# Patient Record
Sex: MALE | Race: WHITE | NOT HISPANIC OR LATINO | Employment: FULL TIME | URBAN - METROPOLITAN AREA
[De-identification: names, ages, dates, MRNs, and addresses within clinical notes are randomized per-mention and may not be internally consistent; named-entity substitution may affect disease eponyms.]

---

## 2020-08-25 ENCOUNTER — OFFICE VISIT (OUTPATIENT)
Dept: URGENT CARE | Facility: CLINIC | Age: 25
End: 2020-08-25
Payer: COMMERCIAL

## 2020-08-25 VITALS
WEIGHT: 160 LBS | SYSTOLIC BLOOD PRESSURE: 123 MMHG | DIASTOLIC BLOOD PRESSURE: 71 MMHG | TEMPERATURE: 98 F | BODY MASS INDEX: 22.4 KG/M2 | HEIGHT: 71 IN | HEART RATE: 73 BPM | RESPIRATION RATE: 18 BRPM | OXYGEN SATURATION: 97 %

## 2020-08-25 DIAGNOSIS — S61.011A LACERATION OF RIGHT THUMB WITHOUT FOREIGN BODY WITHOUT DAMAGE TO NAIL, INITIAL ENCOUNTER: Primary | ICD-10-CM

## 2020-08-25 PROCEDURE — 12001 RPR S/N/AX/GEN/TRNK 2.5CM/<: CPT | Mod: S$GLB,,, | Performed by: NURSE PRACTITIONER

## 2020-08-25 PROCEDURE — 12001 LACERATION REPAIR: ICD-10-PCS | Mod: S$GLB,,, | Performed by: NURSE PRACTITIONER

## 2020-08-25 PROCEDURE — 99203 PR OFFICE/OUTPT VISIT, NEW, LEVL III, 30-44 MIN: ICD-10-PCS | Mod: 25,S$GLB,, | Performed by: NURSE PRACTITIONER

## 2020-08-25 PROCEDURE — 99203 OFFICE O/P NEW LOW 30 MIN: CPT | Mod: 25,S$GLB,, | Performed by: NURSE PRACTITIONER

## 2020-08-25 RX ORDER — IBUPROFEN 600 MG/1
600 TABLET ORAL EVERY 8 HOURS PRN
Qty: 24 TABLET | Refills: 0 | Status: SHIPPED | OUTPATIENT
Start: 2020-08-25 | End: 2020-09-02 | Stop reason: ALTCHOICE

## 2020-08-25 RX ORDER — MUPIROCIN 20 MG/G
OINTMENT TOPICAL
Qty: 22 G | Refills: 0 | Status: SHIPPED | OUTPATIENT
Start: 2020-08-25 | End: 2020-09-02 | Stop reason: ALTCHOICE

## 2020-08-25 NOTE — LETTER
Work Status Summary - Page 1 of 2  ______________________________________________________________________    Date :  August 25, 2020 Carrier :    To :  Fax # :    ______________________________________________________________________    Patient Name: Nicola Gilbert   YOB: 1995   Employer:    Occupation:    Date of Injury: 8/25/2020   Diagnosis: Laceration of right thumb   ______________________________________________________________________     [   ] ABLE to work (pre-injury work level / full duty)    [X  ] NOT ABLE to work at present  Estimated release to return to work: To be determined by Occupational Health on 8/26/2020     [   ] ABLE to work --- transitional duty (as follows):   [   ] Sedentary Work: Lifting 10 lbs. maximum and occasionally lifting and/or  carrying articles such as dockets, ledgers and small tools. Although a sedentary  job is defined as one which involves sitting, a certain amount of walking and  standing is often necessary in carrying out job duties. Jobs are sedentary if  walking and standing are required only occasionally and other sedentary criteria  are met.      [   ] Light Work: Lifting 20 lbs. maximum with frequent lifting and/or carrying of  objects weighing up to 10 lbs. Even though the weight lifted may be only a  negligible amount, a job is in this category when it requires walking or standing  to a significant degree, or when it involves sitting most of the time with a degree  of pushing and pulling of arm and/or leg controls.      [   ] Medium Work: Lifting 50 lbs. maximum with frequent lifting and/or carrying  of objects weighing up to 25 lbs.      [   ] Heavy Work: Lifting 100 lbs. maximum with frequent lifting and/or carrying  of objects weighing up to 50 lbs.      [   ] Very Heavy Work:  Lifting objects in excess of 100 lbs. with frequent lifting  and/or carrying of objects weighing 50 lbs or more.                   THERAPY RECOMMENDATIONS:   [   ]  Physical Therapy     Visits per week:   Duration (in weeks):        [   ] Occupational Therapy  Visits per week:   Duration (in weeks):        Recommended Follow Up:    Primary Care Physician in:   days General Surgeon in:   days   Orthopedist in:   days Ophthalmologist in:   days     Other:  (list other follow up recommendation here)   days     Prescribed Medications:  Ibuprofen 600mg by mouth every 8 hours as needed for pain     Comments:  Keep right hand dressing on x 24 hours-- then may remove and wash with soap and water; pat dry; apply mupirocin and cover with bandage         ______________________________________________________________________  Provider Signature / Print Name / Date / Time       Work Status Summary - Page 2 of 2    Form No. 3291   (Rev 6/21/16)   [employer] [place]

## 2020-08-25 NOTE — PROGRESS NOTES
Subjective:       Patient ID: Nicola Gilbert is a 24 y.o. male.    Chief Complaint: Laceration    This is a 24 y.o. male   who presents today with a chief complaint of right thumb laceration that happened today. He was at work removing sheet medal from his truck when metal sliced his thumb. He hasn't taken any medication to help relieve his symptoms. Per [facility] registry-- tetanus last 11/2018    Laceration   The incident occurred less than 1 hour ago. The laceration is located on the left hand and right hand. The laceration mechanism was a metal edge. The pain is at a severity of 0/10. The patient is experiencing no pain. He reports no foreign bodies present. His tetanus status is UTD.       Constitution: Negative for chills, fatigue and fever.   HENT: Negative for congestion and sore throat.    Neck: Negative for painful lymph nodes.   Cardiovascular: Negative for chest pain and leg swelling.   Eyes: Negative for double vision and blurred vision.   Respiratory: Negative for cough and shortness of breath.    Gastrointestinal: Negative for nausea, vomiting and diarrhea.   Genitourinary: Negative for dysuria, frequency and urgency.   Musculoskeletal: Negative for joint pain, joint swelling, muscle cramps and muscle ache.   Skin: Positive for laceration. Negative for color change, pale and rash.   Allergic/Immunologic: Negative for seasonal allergies.   Neurological: Positive for tingling. Negative for dizziness, history of vertigo, light-headedness, passing out and headaches.   Hematologic/Lymphatic: Negative for swollen lymph nodes, easy bruising/bleeding and history of blood clots. Does not bruise/bleed easily.   Psychiatric/Behavioral: Negative for nervous/anxious, sleep disturbance and depression. The patient is not nervous/anxious.         Objective:      Physical Exam  Constitutional:       Appearance: He is well-developed.   Cardiovascular:      Rate and Rhythm: Normal rate and regular rhythm.   Pulmonary:       Effort: Pulmonary effort is normal. No respiratory distress.      Breath sounds: Normal breath sounds.   Abdominal:      General: There is no distension.      Palpations: Abdomen is soft.   Musculoskeletal: Normal range of motion.      Right hand: He exhibits tenderness and laceration. He exhibits normal range of motion and normal capillary refill. Normal sensation noted. Decreased sensation is not present in the ulnar distribution and is not present in the radial distribution. Normal strength noted. He exhibits no finger abduction and no thumb/finger opposition.        Hands:    Skin:     General: Skin is warm and dry.      Findings: No rash.   Neurological:      Mental Status: He is alert and oriented to person, place, and time.                 Assessment:       1. Laceration of right thumb without foreign body without damage to nail, initial encounter        Plan:         Medications Ordered This Encounter   Medications    ibuprofen (ADVIL,MOTRIN) 600 MG tablet     Sig: Take 1 tablet (600 mg total) by mouth every 8 (eight) hours as needed for Pain.     Dispense:  24 tablet     Refill:  0    mupirocin (BACTROBAN) 2 % ointment     Sig: Apply to affected area 2 times daily     Dispense:  22 g     Refill:  0            Follow up in about 1 day (around 8/26/2020) for Occupational Evaluation.

## 2020-08-25 NOTE — PROCEDURES
Laceration Repair    Date/Time: 8/25/2020 11:40 AM  Performed by: Reena Jones NP  Authorized by: Reena Jones NP   Body area: upper extremity  Location details: right thumb  Laceration length: 1.4 cm  Foreign bodies: no foreign bodies  Tendon involvement: none    Anesthesia:  Local Anesthetic: lidocaine 1% without epinephrine  Anesthetic total: 5 mL  Patient sedated: no  Preparation: Patient was prepped and draped in the usual sterile fashion.  Irrigation solution: saline  Irrigation method: syringe  Amount of cleaning: standard  Debridement: none  Degree of undermining: none  Skin closure: 6-0 Prolene  Number of sutures: 4  Technique: simple  Approximation: close  Approximation difficulty: simple  Dressing: antibiotic ointment and bulky dressing  Patient tolerance: Patient tolerated the procedure well with no immediate complications

## 2020-08-25 NOTE — PATIENT INSTRUCTIONS
Return to Urgent Care or go to ER if symptoms worsen or fail to improve.  Follow up with Occupational Health tomorrow as recommended for further management.       Return to Urgent Care/[facility] in 7-10 days for suture removal.  Keep dressing on wound x 24 hours  After 24 hours, you may remove dressing and wash wound with soap and water twice daily-- pat the area dry  Apply mupirocin ointment and cover with bandage    Extremity Laceration: Sutures, Staples, or Tape  A laceration is a cut through the skin. If it is deep, it may require stitches (sutures) or staples to close so it can heal. Minor cuts may be treated with surgical tape closures.   X-rays may be done if something may have entered the skin through the cut. You may also need a tetanus shot if you are not up to date on this vaccination.  Home care  · Follow the health care providers instructions on how to care for the cut.  · Wash your hands with soap and warm water before and after caring for your wound. This is to help prevent infection.  · Keep the wound clean and dry. If a bandage was applied and it becomes wet or dirty, replace it. Otherwise, leave it in place for the first 24 hours, then change it once a day or as directed.  · If sutures or staples were used, clean the wound daily:  · After removing the bandage, wash the area with soap and water. Use a wet cotton swab to loosen and remove any blood or crust that forms.  · After cleaning, keep the wound clean and dry. Talk with your doctor before applying any antibiotic ointment to the wound. Reapply the bandage.  · You may remove the bandage to shower as usual after the first 24 hours, but do not soak the area in water (no swimming) until the stitches or staples are removed.  · If surgical tape closures were used, keep the area clean and dry. If it becomes wet, blot it dry with a towel.  · The doctor may prescribe an antibiotic cream or ointment to prevent infection. Do not stop taking this  medication until you have finished the prescribed course or the doctor tells you to stop. The doctor may also prescribe medications for pain. Follow the doctors instructions for taking these medications.  · Avoid activities that may reopen your wound.  Follow-up care  Follow up with your health care provider. Most skin wounds heal within ten days. However, an infection may sometimes occur despite proper treatment. Therefore, check the wound daily for the signs of infection listed below. Stitches and staples should be removed within 7-14 days. If surgical tape closures were used, you may remove them after 10 days if they have not fallen off by then.   When to seek medical advice  Call your health care provider right away if any of these occur:  · Wound bleeding not controlled by direct pressure  · Signs of infection, including increasing pain in the wound, increasing wound redness or swelling, or pus or bad odor coming from the wound  · Fever of 100.4°F (38ºC) or higher or as directed by your healthcare provider  · Stitches or staples come apart or fall out or surgical tape falls off before 7 days  · Wound edges re-open  · Wound changes colors  · Numbness around the wound   · Decreased movement around the injured area  Date Last Reviewed: 6/14/2015  © 3322-7500 "[employer]". [address] [place] [address], [place] [address]. All rights reserved. This information is not intended as a substitute for professional medical care. Always follow your healthcare professional's instructions.

## 2020-08-26 ENCOUNTER — OFFICE VISIT (OUTPATIENT)
Dept: URGENT CARE | Facility: CLINIC | Age: 25
End: 2020-08-26
Payer: COMMERCIAL

## 2020-08-26 VITALS
SYSTOLIC BLOOD PRESSURE: 128 MMHG | OXYGEN SATURATION: 100 % | BODY MASS INDEX: 22.4 KG/M2 | HEIGHT: 71 IN | HEART RATE: 100 BPM | TEMPERATURE: 98 F | DIASTOLIC BLOOD PRESSURE: 78 MMHG | RESPIRATION RATE: 18 BRPM | WEIGHT: 160 LBS
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
## 2020-08-26 DIAGNOSIS — S61.011A LACERATION OF RIGHT THUMB WITHOUT FOREIGN BODY WITHOUT DAMAGE TO NAIL, INITIAL ENCOUNTER: Primary | ICD-10-CM

## 2020-08-26 PROCEDURE — 99203 PR OFFICE/OUTPT VISIT, NEW, LEVL III, 30-44 MIN: ICD-10-PCS | Mod: S$GLB,,, | Performed by: PHYSICIAN ASSISTANT

## 2020-08-26 PROCEDURE — 99203 OFFICE O/P NEW LOW 30 MIN: CPT | Mod: S$GLB,,, | Performed by: PHYSICIAN ASSISTANT

## 2020-08-26 RX ORDER — DOXYCYCLINE HYCLATE 100 MG
100 TABLET ORAL 2 TIMES DAILY
Qty: 14 TABLET | Refills: 0 | Status: SHIPPED | OUTPATIENT
Start: 2020-08-26 | End: 2020-09-02 | Stop reason: ALTCHOICE

## 2020-08-26 NOTE — LETTER
[facility] [address]  [place] [address]  Phone: [phone]  Fax: [phone]  [facility]: [phone]    Pt Name: Nicola Gilbert  Injury Date: 08/25/2020   Employee ID: [id number] Date of First Treatment: 08/26/2020   Company: [employer]      Appointment Time: 10:15 AM Arrived: 10:30 a.m.   Provider: Casimiro Cobb PA-C Time Out: 11:50 p.m.     Office Treatment:   1. Laceration of right thumb without foreign body without damage to nail, initial encounter    2. Work related injury      Medications Ordered This Encounter   Medications    doxycycline (VIBRA-TABS) 100 MG tablet      Patient Instructions: Keep dressing clean/dry/covered    Restrictions: Limited use of right hand and arm, No lifting/pushing/pulling more than 10 lbs(Avoid climbing ladders or scalffolds.)     Return Appointment: 9/2/2020 at 1:00 p.m.  [place]

## 2020-08-26 NOTE — PATIENT INSTRUCTIONS
Wound Check, No Infection  Your wound is healing as expected. There are no signs of infection.   Home care  Continue to care for your wound as directed.  · Cover your wound with a bandage unless your healthcare provider tells you not to.  · Gently clean your wound with soap and water when you shower.   · Unless told otherwise, avoid swimming and taking tub baths until your wound has healed.  Follow-up care  Follow up with your healthcare provider as advised.  · If you have sutures or staples, return as directed to have them removed. If they are not taken out on time, they may be harder to remove and scarring may be worse. Infection may develop.  · If surgical tape strips were used, you can remove them yourself if they have not fallen off by 10 days after they were applied.   When to seek medical advice  Call your healthcare provider right away if any of these occur:  · Fever of 100.4ºF (38ºC) or higher, or as directed by your health care provider  · Symptoms of a wound infection, including:  ¨ Redness or swelling around the wound  ¨ Warmth coming from the wound  ¨ New or worsening pain  ¨ Red streaks around the wound  ¨ Draining pus  Date Last Reviewed: 8/24/2015  © 0968-7719 [employer]. [address] [place] [address], [place] [address]. All rights reserved. This information is not intended as a substitute for professional medical care. Always follow your healthcare professional's instructions.

## 2020-08-26 NOTE — PROGRESS NOTES
Subjective:       Patient ID: Nicola Gilbert is a 24 y.o. male.    Chief Complaint: Laceration (Right thumb)    New injury, Laceration right thumb (DOI 8/25/2020) [employer] [facility]- Patient comes in as a f/u from [facility] on 8/25/2020. Stiches were placed and Ibuprofen 600 mg rx given. 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 Pain level 1/10 with some throbbing pain. TD up to date. [place]    Laceration   The incident occurred 12 to 24 hours ago. The laceration is located on the right hand (thumb). The laceration mechanism was a metal edge. The pain is at a severity of 1/10. The pain is mild. The pain has been intermittent since onset. He reports no foreign bodies present. His tetanus status is UTD.       Constitution: Negative for fatigue.   HENT: Negative for facial swelling and facial trauma.    Neck: Negative for neck stiffness.   Cardiovascular: Negative for chest trauma.   Eyes: Negative for eye trauma, double vision and blurred vision.   Gastrointestinal: Negative for abdominal trauma, abdominal pain and rectal bleeding.   Endocrine: negative.   Genitourinary: Negative for hematuria, genital trauma and pelvic pain.   Musculoskeletal: Positive for pain and trauma. Negative for joint swelling, abnormal ROM of joint and pain with walking.   Skin: Positive for laceration. Negative for color change, wound and abrasion.   Allergic/Immunologic: Negative.    Neurological: Negative for dizziness, history of vertigo, light-headedness, coordination disturbances, altered mental status and loss of consciousness.   Hematologic/Lymphatic: Negative for history of bleeding disorder.   Psychiatric/Behavioral: Negative for altered mental status.        Objective:      Physical Exam  Vitals signs and nursing note reviewed.   Constitutional:       General: He is not in acute distress.     Appearance: He is well-developed. He is not  diaphoretic.   HENT:      Head: Normocephalic and atraumatic.      Right Ear: Hearing and external ear normal.      Left Ear: Hearing and external ear normal.      Nose: Nose normal. No nasal deformity.   Eyes:      General: Lids are normal. No scleral icterus.     Conjunctiva/sclera: Conjunctivae normal.   Neck:      Musculoskeletal: Normal range of motion.      Trachea: Trachea normal.   Cardiovascular:      Pulses: Normal pulses.   Pulmonary:      Effort: Pulmonary effort is normal. No respiratory distress.      Breath sounds: No stridor.   Musculoskeletal:      Right hand: He exhibits laceration. He exhibits normal range of motion, normal capillary refill, no deformity and no swelling. Normal sensation noted.        Hands:    Skin:     General: Skin is warm and dry.      Capillary Refill: Capillary refill takes less than 2 seconds.   Neurological:      Mental Status: He is alert. He is not disoriented.      GCS: GCS eye subscore is 4. GCS verbal subscore is 5. GCS motor subscore is 6.      Sensory: No sensory deficit.   Psychiatric:         Attention and Perception: He is attentive.         Speech: Speech normal.         Behavior: Behavior normal.         Assessment:       1. Laceration of right thumb without foreign body without damage to nail, initial encounter    2. Work related injury        Plan:         Medications Ordered This Encounter   Medications    doxycycline (VIBRA-TABS) 100 MG tablet     Sig: Take 1 tablet (100 mg total) by mouth 2 (two) times daily.     Dispense:  14 tablet     Refill:  0     Patient Instructions: Keep dressing clean/dry/covered   Restrictions: Limited use of right hand and arm, No lifting/pushing/pulling more than 10 lbs(Avoid climbing ladders or scalffolds.)  Follow up in about 1 week (around 9/2/2020) for suture removal.        Patient Instructions     Wound Check, No Infection  Your wound is healing as expected. There are no signs of infection.   Home care  Continue to care  for your wound as directed.  · Cover your wound with a bandage unless your healthcare provider tells you not to.  · Gently clean your wound with soap and water when you shower.   · Unless told otherwise, avoid swimming and taking tub baths until your wound has healed.  Follow-up care  Follow up with your healthcare provider as advised.  · If you have sutures or staples, return as directed to have them removed. If they are not taken out on time, they may be harder to remove and scarring may be worse. Infection may develop.  · If surgical tape strips were used, you can remove them yourself if they have not fallen off by 10 days after they were applied.   When to seek medical advice  Call your healthcare provider right away if any of these occur:  · Fever of 100.4ºF (38ºC) or higher, or as directed by your health care provider  · Symptoms of a wound infection, including:  ¨ Redness or swelling around the wound  ¨ Warmth coming from the wound  ¨ New or worsening pain  ¨ Red streaks around the wound  ¨ Draining pus  Date Last Reviewed: 8/24/2015  © 9152-0471 [employer]. [address] [place] [address]. All rights reserved. This information is not intended as a substitute for professional medical care. Always follow your healthcare professional's instructions.

## 2020-09-02 ENCOUNTER — OCCUPATIONAL HEALTH (OUTPATIENT)
Dept: URGENT CARE | Facility: CLINIC | Age: 25
End: 2020-09-02
Payer: COMMERCIAL

## 2020-09-02 DIAGNOSIS — Z48.02 VISIT FOR SUTURE REMOVAL: ICD-10-CM

## 2020-09-02 DIAGNOSIS — S61.011A LACERATION OF RIGHT THUMB WITHOUT FOREIGN BODY WITHOUT DAMAGE TO NAIL, INITIAL ENCOUNTER: Primary | ICD-10-CM
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
## 2020-09-02 PROCEDURE — 99024 POSTOP FOLLOW-UP VISIT: CPT | Mod: S$GLB,,, | Performed by: PHYSICIAN ASSISTANT

## 2020-09-02 PROCEDURE — 99214 PR OFFICE/OUTPT VISIT, EST, LEVL IV, 30-39 MIN: ICD-10-PCS | Mod: S$GLB,,, | Performed by: PHYSICIAN ASSISTANT

## 2020-09-02 PROCEDURE — 99214 OFFICE O/P EST MOD 30 MIN: CPT | Mod: S$GLB,,, | Performed by: PHYSICIAN ASSISTANT

## 2020-09-02 PROCEDURE — 99024 SUTURE REMOVAL: ICD-10-PCS | Mod: S$GLB,,, | Performed by: PHYSICIAN ASSISTANT

## 2020-09-02 NOTE — PROCEDURES
Suture Removal    Date/Time: 9/2/2020 10:05 AM  Location procedure was performed: [facility]  Performed by: Casimiro Cobb PA-C  Authorized by: Casimiro Cobb PA-C   Pre-operative diagnosis: right thumb lac  Post-operative diagnosis: same  Body area: upper extremity  Location details: right thumb  Wound Appearance: clean, well healed, normal color, nontender and no drainage  Sutures Removed: 4  Post-removal: bandaid applied  Complications: No  Patient tolerance: Patient tolerated the procedure well with no immediate complications

## 2020-09-02 NOTE — PATIENT INSTRUCTIONS
"  Suture or Staple Removal  You were seen today for a suture or staple removal. Your wound is healing as expected. The wound has healed well enough that the sutures or staples can be removed. The wound will continue to heal for the next few months.  At this time there is no sign of infection.   Home care  · If you have pain, take pain medicine as advised by your healthcare provider.   · Keep your wound clean and protected by covering it with a bandage for the next week or so.   · Wash your hands with soap and warm water before and after caring for your wound. This helps prevent infection.  · Clean the wound gently with soap and warm water daily or as directed by your childs health care provider. Do not use iodine, alcohol, or other cleansers on the wound.  Gently pat it dry. Put on a new bandage, if needed. Do not reuse bandages.  · If the area gets wet, gently pat it dry with a clean cloth. Replace the wet bandage with a dry one.  · Check the wound daily for signs of infection. (These are listed under "When to seek medical advice" below.)  · You may shower and bathe as usual. Swimming is now permitted.  Follow-up care  Follow up with your healthcare provider as advised.  When to seek medical advice   Call your healthcare provider if any of the following occur:  · Wound reopens or bleeds  · Signs of an infection, such as:  ¨ Increasing redness or swelling around the wound  ¨ Increased warmth from the wound  ¨ Worsening pain  ¨ Red streaking lines away from the wound  ¨ Fluid draining from the wound  · Fever of 100.4°F (38°C) or higher, or as directed by your child's healthcare provider  Date Last Reviewed: 9/27/2015  © 0955-6129 [employer]. [address] [place] [address], [place] [address]. All rights reserved. This information is not intended as a substitute for professional medical care. Always follow your healthcare professional's instructions.        "

## 2020-09-02 NOTE — PROGRESS NOTES
Subjective:       Patient ID: Nicola Gilbert is a 24 y.o. male.    Chief Complaint: Laceration (Right thumb)    RV, Laceration right thumb (DOI 8/25/2020) Flex Stephen- Patient states the area looks well and he is not having any pain or discomfort. Asked for possible stitch removal on today. Not currently taking any medication for his right thumb, did not take antibiotic. [place]    Laceration   The incident occurred more than 1 week ago. The laceration is located on the right hand (right thumb). The laceration is 1 cm in size. The laceration mechanism was a metal edge. The pain is at a severity of 0/10. The patient is experiencing no pain. The pain has been improving since onset. His tetanus status is UTD.       Constitution: Negative for fatigue.   HENT: Negative for facial swelling and facial trauma.    Neck: Negative for neck stiffness.   Cardiovascular: Negative for chest trauma.   Eyes: Negative for eye trauma, double vision and blurred vision.   Respiratory: Negative.    Gastrointestinal: Negative for abdominal trauma, abdominal pain and rectal bleeding.   Endocrine: negative.   Genitourinary: Negative for hematuria, genital trauma and pelvic pain.   Musculoskeletal: Negative for pain, trauma, joint swelling, abnormal ROM of joint and pain with walking.   Skin: Positive for wound and laceration. Negative for color change and abrasion.   Allergic/Immunologic: Negative.    Neurological: Negative for dizziness, history of vertigo, light-headedness, coordination disturbances, altered mental status and loss of consciousness.   Hematologic/Lymphatic: Negative for history of bleeding disorder.   Psychiatric/Behavioral: Negative for altered mental status.        Objective:      Physical Exam  Nursing note reviewed.   Constitutional:       General: He is not in acute distress.     Appearance: He is well-developed. He is not diaphoretic.   HENT:      Head: Normocephalic and atraumatic.      Right Ear: Hearing and  external ear normal.      Left Ear: Hearing and external ear normal.      Nose: Nose normal. No nasal deformity.   Eyes:      General: Lids are normal. No scleral icterus.     Conjunctiva/sclera: Conjunctivae normal.   Neck:      Musculoskeletal: Normal range of motion.      Trachea: Trachea normal.   Cardiovascular:      Pulses: Normal pulses.   Pulmonary:      Effort: Pulmonary effort is normal. No respiratory distress.      Breath sounds: No stridor.   Musculoskeletal:      Right hand: He exhibits laceration. He exhibits normal range of motion, normal capillary refill, no deformity and no swelling. Normal sensation noted.        Hands:    Skin:     General: Skin is warm and dry.      Capillary Refill: Capillary refill takes less than 2 seconds.   Neurological:      Mental Status: He is alert. He is not disoriented.      GCS: GCS eye subscore is 4. GCS verbal subscore is 5. GCS motor subscore is 6.      Sensory: No sensory deficit.   Psychiatric:         Attention and Perception: He is attentive.         Speech: Speech normal.         Behavior: Behavior normal.         Assessment:       1. Laceration of right thumb without foreign body without damage to nail, initial encounter    2. Visit for suture removal    3. Work related injury        Plan:       Suture Removal    Date/Time: 9/2/2020 10:05 AM  Location procedure was performed: [facility]  Performed by: Casimiro Cobb PA-C  Authorized by: Casimiro Cobb PA-C   Pre-operative diagnosis: right thumb lac  Post-operative diagnosis: same  Body area: upper extremity  Location details: right thumb  Wound Appearance: clean, well healed, normal color, nontender and no drainage  Sutures Removed: 4  Post-removal: bandaid applied  Complications: No  Patient tolerance: Patient tolerated the procedure well with no immediate complications              Resume regular duty.  Discharged from Occupational Health.            Follow up if  symptoms worsen or fail to improve.

## 2020-09-02 NOTE — LETTER
[facility] - [address] [place] [address]  Phone: [phone]  Fax: [phone]  [facility]: [phone]    Pt Name: Nicola Gilbert  Injury Date: 08/25/2020   Employee ID: [id number] Date of  Treatment: 09/02/2020   Company: [employer]      Appointment Time: 09:50 AM Arrived: 9:51 a.m.   Provider: Casimiro Cobb PA-C Time Out: 10:41 a.m.     Office Treatment:   1. Laceration of right thumb without foreign body without damage to nail, initial encounter    2. Visit for suture removal    3. Work related injury       Regular Duty, Discharged from Occupational Health, No restrictions              Return Appointment: Discharged from Occupational Health  [place]

## 2020-12-29 ENCOUNTER — CLINICAL SUPPORT (OUTPATIENT)
Dept: URGENT CARE | Facility: CLINIC | Age: 25
End: 2020-12-29
Payer: COMMERCIAL

## 2020-12-29 DIAGNOSIS — U07.1 COVID-19 VIRUS DETECTED: ICD-10-CM

## 2020-12-29 DIAGNOSIS — Z13.9 ENCOUNTER FOR SCREENING: Primary | ICD-10-CM

## 2020-12-29 LAB
CTP QC/QA: YES
SARS-COV-2 RDRP RESP QL NAA+PROBE: POSITIVE

## 2020-12-29 PROCEDURE — U0002: ICD-10-PCS | Mod: QW,S$GLB,, | Performed by: EMERGENCY MEDICINE

## 2020-12-29 PROCEDURE — U0002 COVID-19 LAB TEST NON-CDC: HCPCS | Mod: QW,S$GLB,, | Performed by: EMERGENCY MEDICINE

## 2020-12-29 NOTE — LETTER
[address] ? [place], [address] ? Phone [phone] ? Fax [phone]           Return to Work/School    Patient: Nicola Gilbert  YOB: 1995   Date: 12/29/2020      To Whom It May Concern:     Nicola Gilbert was in contact with/seen in my office on 12/29/2020. COVID-19 is present in our communities across the state. Not all patients are eligible or appropriate to be tested. In this situation, your employee meets the following criteria:     Nicola Gilbert has met the criteria for COVID-19 testing and has a POSITIVE result. He can return to work once they are fever free for 24 hours without the use of fever reducing medications AND at least ten days from the start of symptoms (or from the first positive result if they have no symptoms).      If you have any questions or concerns, or if I can be of further assistance, please do not hesitate to contact me.     Sincerely,      [facility], KATIE